# Patient Record
Sex: FEMALE | Race: WHITE | NOT HISPANIC OR LATINO
[De-identification: names, ages, dates, MRNs, and addresses within clinical notes are randomized per-mention and may not be internally consistent; named-entity substitution may affect disease eponyms.]

---

## 2017-06-15 ENCOUNTER — APPOINTMENT (OUTPATIENT)
Dept: OBGYN | Facility: CLINIC | Age: 67
End: 2017-06-15

## 2017-10-05 ENCOUNTER — APPOINTMENT (OUTPATIENT)
Dept: OBGYN | Facility: CLINIC | Age: 67
End: 2017-10-05
Payer: COMMERCIAL

## 2017-10-05 ENCOUNTER — ASOB RESULT (OUTPATIENT)
Age: 67
End: 2017-10-05

## 2017-10-05 PROCEDURE — 76830 TRANSVAGINAL US NON-OB: CPT

## 2018-10-31 ENCOUNTER — APPOINTMENT (OUTPATIENT)
Dept: OBGYN | Facility: CLINIC | Age: 68
End: 2018-10-31
Payer: COMMERCIAL

## 2018-10-31 ENCOUNTER — ASOB RESULT (OUTPATIENT)
Age: 68
End: 2018-10-31

## 2018-10-31 VITALS
WEIGHT: 165 LBS | BODY MASS INDEX: 29.23 KG/M2 | HEIGHT: 63 IN | SYSTOLIC BLOOD PRESSURE: 111 MMHG | DIASTOLIC BLOOD PRESSURE: 75 MMHG

## 2018-10-31 DIAGNOSIS — Z12.12 ENCOUNTER FOR SCREENING FOR MALIGNANT NEOPLASM OF RECTUM: ICD-10-CM

## 2018-10-31 PROCEDURE — 99397 PER PM REEVAL EST PAT 65+ YR: CPT

## 2018-10-31 PROCEDURE — 82270 OCCULT BLOOD FECES: CPT

## 2018-10-31 PROCEDURE — 76830 TRANSVAGINAL US NON-OB: CPT

## 2018-11-06 ENCOUNTER — MESSAGE (OUTPATIENT)
Age: 68
End: 2018-11-06

## 2018-11-06 LAB — CYTOLOGY CVX/VAG DOC THIN PREP: NORMAL

## 2019-07-17 ENCOUNTER — OTHER (OUTPATIENT)
Age: 69
End: 2019-07-17

## 2019-09-26 ENCOUNTER — MOBILE ON CALL (OUTPATIENT)
Age: 69
End: 2019-09-26

## 2020-02-25 ENCOUNTER — APPOINTMENT (OUTPATIENT)
Dept: OBGYN | Facility: CLINIC | Age: 70
End: 2020-02-25

## 2020-04-29 ENCOUNTER — TRANSCRIPTION ENCOUNTER (OUTPATIENT)
Age: 70
End: 2020-04-29

## 2020-10-05 ENCOUNTER — APPOINTMENT (OUTPATIENT)
Dept: OBGYN | Facility: CLINIC | Age: 70
End: 2020-10-05

## 2021-09-24 ENCOUNTER — RESULT REVIEW (OUTPATIENT)
Age: 71
End: 2021-09-24

## 2022-03-01 ENCOUNTER — APPOINTMENT (OUTPATIENT)
Dept: NEUROSURGERY | Facility: CLINIC | Age: 72
End: 2022-03-01
Payer: MEDICARE

## 2022-03-01 VITALS
DIASTOLIC BLOOD PRESSURE: 75 MMHG | HEART RATE: 66 BPM | HEIGHT: 63 IN | BODY MASS INDEX: 31.89 KG/M2 | OXYGEN SATURATION: 98 % | WEIGHT: 180 LBS | TEMPERATURE: 98 F | SYSTOLIC BLOOD PRESSURE: 118 MMHG

## 2022-03-01 PROCEDURE — 99203 OFFICE O/P NEW LOW 30 MIN: CPT

## 2022-03-07 ENCOUNTER — NON-APPOINTMENT (OUTPATIENT)
Age: 72
End: 2022-03-07

## 2022-03-07 ENCOUNTER — APPOINTMENT (OUTPATIENT)
Dept: ORTHOPEDIC SURGERY | Facility: CLINIC | Age: 72
End: 2022-03-07
Payer: MEDICARE

## 2022-03-07 VITALS
HEIGHT: 63 IN | BODY MASS INDEX: 31.89 KG/M2 | DIASTOLIC BLOOD PRESSURE: 71 MMHG | HEART RATE: 73 BPM | SYSTOLIC BLOOD PRESSURE: 127 MMHG | WEIGHT: 180 LBS

## 2022-03-07 DIAGNOSIS — M17.0 BILATERAL PRIMARY OSTEOARTHRITIS OF KNEE: ICD-10-CM

## 2022-03-07 DIAGNOSIS — M16.0 BILATERAL PRIMARY OSTEOARTHRITIS OF HIP: ICD-10-CM

## 2022-03-07 PROCEDURE — 73522 X-RAY EXAM HIPS BI 3-4 VIEWS: CPT

## 2022-03-07 PROCEDURE — 73562 X-RAY EXAM OF KNEE 3: CPT | Mod: 50

## 2022-03-07 PROCEDURE — 99203 OFFICE O/P NEW LOW 30 MIN: CPT

## 2022-03-07 NOTE — ASSESSMENT
[FreeTextEntry1] : Impression:\par RIGHT Pulsatile Tinnitus - High pitched\par Improves with ipsilateral neck pressure\par No Headaches or Vision Complaints\par \par We discussed possible causes of pulsatile tinnitus including:\par ENT causes such as semicircular canal dehiscence, tumor, ototoxicity\par Cardiac causes such as aortic stenosis, valve disease, HTN, other causes of heart murmur\par Anemia\par Thyroid disease\par Cerebrovascular causes such as arteriovenous malformation (AVM), dural arteriovenous fistula (dAVF), venous sinus stenosis, venous aneurysm, large trans-mastoid emissary vein, carotid stenosis, jugular bulb diverticulum \par \par MRA Head and Neck 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 Baker - Neuro-Otology\par Follow Up with Me After the Above

## 2022-03-07 NOTE — HISTORY OF PRESENT ILLNESS
[de-identified] : Ms. VÁZQUEZ is a pleasant 71 year old female who presents today with a chief complaint of RIGHT ear pulsatile tinnitus.  It first started 2014 shortly after a fall.  It started as a very loud machinery sound and improved to ringing tinnitus which was tolerable.  Over the last month she states that the sound has been getting significantly worse and changed.  Now it is described as an intermittent, high pitched sound that is rhythmic but she is unsure if it is in sync with her pulse.  Pressing on the right side of her neck and improves the sound. Laying on her left side makes it worse.  She has been seen by ENT in the past and had a normal hearing test as per patient.  \par \par She denies any headaches.\par \par She has floaters and blurry vision, she sees the ophthalmologist regularly and is pending cataract surgery.\par \par She saw Dr. Sams who could not find a vascular cause for her pulsatile tinnitus.  He referred her to neurotology but has not seen them yet.

## 2022-05-10 ENCOUNTER — APPOINTMENT (OUTPATIENT)
Dept: OTOLARYNGOLOGY | Facility: CLINIC | Age: 72
End: 2022-05-10
Payer: MEDICARE

## 2022-05-10 VITALS
SYSTOLIC BLOOD PRESSURE: 115 MMHG | HEART RATE: 71 BPM | HEIGHT: 63 IN | BODY MASS INDEX: 31.89 KG/M2 | DIASTOLIC BLOOD PRESSURE: 67 MMHG | WEIGHT: 180 LBS

## 2022-05-10 DIAGNOSIS — H93.A9 PULSATILE TINNITUS, UNSPECIFIED EAR: ICD-10-CM

## 2022-05-10 DIAGNOSIS — M26.623 ARTHRALGIA OF BILATERAL TEMPOROMANDIBULAR JOINT: ICD-10-CM

## 2022-05-10 DIAGNOSIS — R51.9 HEADACHE, UNSPECIFIED: ICD-10-CM

## 2022-05-10 DIAGNOSIS — R93.0 ABNORMAL FINDINGS ON DIAGNOSTIC IMAGING OF SKULL AND HEAD, NOT ELSEWHERE CLASSIFIED: ICD-10-CM

## 2022-05-10 PROCEDURE — 92504 EAR MICROSCOPY EXAMINATION: CPT

## 2022-05-10 PROCEDURE — 99204 OFFICE O/P NEW MOD 45 MIN: CPT

## 2022-05-10 RX ORDER — CELECOXIB 200 MG/1
200 CAPSULE ORAL DAILY
Qty: 30 | Refills: 1 | Status: DISCONTINUED | COMMUNITY
Start: 2022-03-07 | End: 2022-05-10

## 2022-05-10 RX ORDER — FAMOTIDINE 40 MG/1
40 TABLET, FILM COATED ORAL
Refills: 0 | Status: ACTIVE | COMMUNITY

## 2022-05-11 PROBLEM — R93.0 ABNORMAL CT SCAN OF HEAD: Status: ACTIVE | Noted: 2022-05-11

## 2022-05-11 PROBLEM — R51.9 HEADACHE: Status: ACTIVE | Noted: 2022-05-11

## 2022-05-11 PROBLEM — H93.A9 PULSATILE TINNITUS: Noted: 2022-03-01

## 2022-05-11 NOTE — DATA REVIEWED
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

## 2022-05-11 NOTE — PROCEDURE
[] : Binocular Microscopy [FreeTextEntry1] : pulsatile tilnnitus [FreeTextEntry6] : Operative microscope was used to examine the ear canal, ear drum, and visible middle ear landmarks. Adequate exam would not have been possible without the use of a microscope. Findings are described.

## 2022-05-11 NOTE — CONSULT LETTER
[Dear  ___] : Dear  [unfilled], [Sincerely,] : Sincerely, [Consult Letter:] : I had the pleasure of evaluating your patient, [unfilled]. [Please see my note below.] : Please see my note below. [Consult Closing:] : Thank you very much for allowing me to participate in the care of this patient.  If you have any questions, please do not hesitate to contact me. [FreeTextEntry3] : Meka Baker MD, Otology Neurotology & Skull Base Surgery

## 2022-05-11 NOTE — HISTORY OF PRESENT ILLNESS
[de-identified] : 71 year old female here for initial consultation referred by Dr. Karissa Lutz for pulsatile tinnitus \par Report tinnitus started in 2014 after falling- tinnitus was only in right ear \par Reports 1 month later hearing a "loud mechanical sound"-resolved on its own \par Currently described tinnitus as an intermittent humming sound with fluctuations in volume \par Reports volume increases  when on left side\par Patient reports tinnitus changed in December \par Reports intermittent headache occurring mostly on right side\par Reports intermittent  the pain in alternating areas (behind the ear, inside the ear) \par Patient reports pain in the scalp when she lightly touches her hair. \par MRI of brain 1/2022\par Reports noticing balance issues-currently in PT\par History of TMJ when younger\par Denies history of migraines \par Patient denies otalgia, ear infections, hearing loss, dizziness, vertigo, headaches related to hearing.

## 2022-05-11 NOTE — REASON FOR VISIT
[Initial Consultation] : an initial consultation for [FreeTextEntry2] : referred by Dr. Karissa Lutz for pulsatile tinnitus

## 2022-05-11 NOTE — PHYSICAL EXAM
[Binocular Microscopic Exam] : Binocular microscopic exam was performed [Rinne Test Air Conduction Persists > Bone Conduction Right] : air conduction greater than bone conduction on the right [Rinne Test Air Conduction Persists > Bone Conduction Left] : air conduction greater than bone conduction on the left [Hearing Levy Test (Tuning Fork On Forehead)] : no lateralization of tone [Midline] : trachea located in midline position [de-identified] : + TTP [Normal] : the left mastoid was normal [Hearing Loss Right Only] : normal [Hearing Loss Left Only] : normal [Nystagmus] : ~T no ~M nystagmus was seen [Fukuda Step Test] : Fukuda Step Test was Negative [Romberg's Sign] : Romberg's sign was absent [Fistula Sign] : Fistula Sign: Negative [Past-Pointing] : Past-Pointing: Negative [Law-Hallfamke] : Brainard-Hallpike: Negative

## 2022-07-05 ENCOUNTER — APPOINTMENT (OUTPATIENT)
Dept: NEUROSURGERY | Facility: CLINIC | Age: 72
End: 2022-07-05

## 2022-07-05 VITALS
DIASTOLIC BLOOD PRESSURE: 80 MMHG | BODY MASS INDEX: 31.89 KG/M2 | HEART RATE: 76 BPM | WEIGHT: 180 LBS | SYSTOLIC BLOOD PRESSURE: 135 MMHG | HEIGHT: 63 IN | OXYGEN SATURATION: 97 %

## 2022-07-05 DIAGNOSIS — H93.A1 PULSATILE TINNITUS, RIGHT EAR: ICD-10-CM

## 2022-07-05 PROCEDURE — 99213 OFFICE O/P EST LOW 20 MIN: CPT

## 2022-07-18 NOTE — HISTORY OF PRESENT ILLNESS
[de-identified] : Ms. VÁZQUEZ is a pleasant 71 year old female who presents today with a chief complaint for follow up of RIGHT ear pulsatile tinnitus.  It first started 2014 shortly after a fall.  It started as a very loud machinery sound and improved to ringing tinnitus which was tolerable.  Over the last month she states that the sound has been getting significantly worse and changed.  Now it is described as an intermittent, high pitched sound that is rhythmic but she is unsure if it is in sync with her pulse.  Pressing on the right side of her neck and improves the sound. Laying on her left side makes it worse.  She has been seen by ENT in the past and had a normal hearing test as per patient.  \par \par She has floaters and blurry vision, she sees the ophthalmologist regularly and is pending cataract surgery.\par \par She saw Dr. Sams who could not find a vascular cause for her pulsatile tinnitus. \par \par Since our last visit, she saw Dr. Baker who ordered MFRT and dietary management of migraines.  She started MFRT and she states that the pulsatile tinnitus is not as bothersome since then.

## 2022-07-18 NOTE — ASSESSMENT
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  I recommend continuing physical therapy since it is helping and following up with me if pulsatile tinnitus becomes more bothersome to discuss need for catheter angiogram/venogram/BTO.\par \par Plan:\par Continue C-spine PT/MFRT\par Follow Up with Me As Needed\par

## 2023-02-22 ENCOUNTER — APPOINTMENT (OUTPATIENT)
Dept: OBGYN | Facility: CLINIC | Age: 73
End: 2023-02-22
Payer: MEDICARE

## 2023-02-22 VITALS
HEIGHT: 63 IN | DIASTOLIC BLOOD PRESSURE: 80 MMHG | WEIGHT: 180 LBS | BODY MASS INDEX: 31.89 KG/M2 | SYSTOLIC BLOOD PRESSURE: 130 MMHG

## 2023-02-22 DIAGNOSIS — Z01.419 ENCOUNTER FOR GYNECOLOGICAL EXAMINATION (GENERAL) (ROUTINE) W/OUT ABNORMAL FINDINGS: ICD-10-CM

## 2023-02-22 DIAGNOSIS — R92.2 INCONCLUSIVE MAMMOGRAM: ICD-10-CM

## 2023-02-22 PROCEDURE — G0101: CPT

## 2023-02-22 PROCEDURE — 99387 INIT PM E/M NEW PAT 65+ YRS: CPT | Mod: GY

## 2023-02-24 LAB — HPV HIGH+LOW RISK DNA PNL CVX: NOT DETECTED

## 2023-02-27 LAB — CYTOLOGY CVX/VAG DOC THIN PREP: ABNORMAL

## 2024-05-09 ENCOUNTER — APPOINTMENT (OUTPATIENT)
Dept: ULTRASOUND IMAGING | Facility: CLINIC | Age: 74
End: 2024-05-09
Payer: MEDICARE

## 2024-05-09 PROCEDURE — 76700 US EXAM ABDOM COMPLETE: CPT
